# Patient Record
Sex: MALE | Race: WHITE | NOT HISPANIC OR LATINO | Employment: FULL TIME | URBAN - METROPOLITAN AREA
[De-identification: names, ages, dates, MRNs, and addresses within clinical notes are randomized per-mention and may not be internally consistent; named-entity substitution may affect disease eponyms.]

---

## 2024-03-04 ENCOUNTER — HOSPITAL ENCOUNTER (EMERGENCY)
Facility: CLINIC | Age: 64
Discharge: HOME | End: 2024-03-04
Attending: EMERGENCY MEDICINE | Admitting: EMERGENCY MEDICINE
Payer: OTHER MISCELLANEOUS

## 2024-03-04 VITALS
TEMPERATURE: 98 F | OXYGEN SATURATION: 98 % | SYSTOLIC BLOOD PRESSURE: 156 MMHG | DIASTOLIC BLOOD PRESSURE: 96 MMHG | RESPIRATION RATE: 16 BRPM | HEART RATE: 97 BPM

## 2024-03-04 DIAGNOSIS — S61.412A LACERATION OF LEFT HAND WITHOUT FOREIGN BODY, INITIAL ENCOUNTER: ICD-10-CM

## 2024-03-04 PROCEDURE — 99283 EMERGENCY DEPT VISIT LOW MDM: CPT

## 2024-03-04 PROCEDURE — 12001 RPR S/N/AX/GEN/TRNK 2.5CM/<: CPT

## 2024-03-04 ASSESSMENT — ACTIVITIES OF DAILY LIVING (ADL)
ADLS_ACUITY_SCORE: 35
ADLS_ACUITY_SCORE: 35

## 2024-03-04 ASSESSMENT — COLUMBIA-SUICIDE SEVERITY RATING SCALE - C-SSRS
6. HAVE YOU EVER DONE ANYTHING, STARTED TO DO ANYTHING, OR PREPARED TO DO ANYTHING TO END YOUR LIFE?: NO
1. IN THE PAST MONTH, HAVE YOU WISHED YOU WERE DEAD OR WISHED YOU COULD GO TO SLEEP AND NOT WAKE UP?: NO
2. HAVE YOU ACTUALLY HAD ANY THOUGHTS OF KILLING YOURSELF IN THE PAST MONTH?: NO

## 2024-03-04 NOTE — ED PROVIDER NOTES
History     Chief Complaint:  Laceration       HPI   Naina Presley is a 63 year old male with no significant past medical history significant who presents to the ED via/accompanied by self with a chief complaint of chief complaint of laceration to the palmar base of left thumb occurring at work while cleaning superglue with a knife just prior to arrival.  Patient reports moderate pain is worse with movement.  He reports that he is right-hand dominant.      Independent Historian: history provided by the patient    Review of External Notes: See MDM        Allergies:  No Known Allergies     Medications:    oxyCODONE-acetaminophen (PERCOCET) 5-325 MG per tablet        Past Medical History:    No past medical history on file.    Past Surgical History:    Past Surgical History:   Procedure Laterality Date    AMPUTATION      CARDIAC SURGERY      ORTHOPEDIC SURGERY          Family History:    family history is not on file.    Social History:   reports that he has never smoked. He does not have any smokeless tobacco history on file. He reports that he does not drink alcohol.  PCP: Lloyd Ford     Physical Exam   Patient Vitals for the past 24 hrs:   BP Temp Temp src Pulse Resp SpO2   03/04/24 0532 (!) 156/96 98  F (36.7  C) Temporal 97 16 98 %        Physical Exam  Constitutional: Well developed, nontox appearance  Head: Atraumatic.   Neck:  no stridor  Eyes: no scleral icterus  Cardiovascular: RRR, 2+ bilat radial pulses  Pulmonary/Chest: nml resp effort  Ext: Warm, well perfused, no edema  LUE: 2 cm laceration to the palmar surface of the left thumb base, hemostatic, full range of motion, no laxity or weakness, brisk cap refill, sensation grossly intact  Neurological: A&O, symmetric facies, moves ext x4  Skin: Skin is warm and dry.   Psychiatric: Behavior is normal. Thought content normal.   Nursing note and vitals reviewed.    Emergency Department Course   ECG:  No results found for this or any previous  visit.    Imaging:  No orders to display        Report per radiology unless otherwise specified in report or noted in MDM    Laboratory:  Labs Ordered and Resulted from Time of ED Arrival to Time of ED Departure - No data to display     Procedures       Narrative: Procedure: Laceration Repair        LACERATION:  A simple and superficial clean 2 cm laceration.      LOCATION:  L hand, palmar base of L thumb      FUNCTION:  Distally sensation, circulation, motor, and tendon function are intact.      ANESTHESIA:  Local using bupivacaine 0.5% total of 3 mLs      PREPARATION:  Irrigation and Scrubbing with Normal Saline and Shur Clens      DEBRIDEMENT:  no debridement and wound explored, no foreign body found      CLOSURE:  Wound was closed with One Layer.  Skin closed with 5 x 5.0 Ethilon using interrupted sutures.    Emergency Department Course & Assessments:             Interventions:  Medications - No data to display     Independent Interpretation (X-rays, CTs, rhythm strip):  See MDM    Consultations/Discussion of Management or Tests:  none     Social Determinants of Health affecting care:  See MDM    Disposition:  The patient was discharged to home.     Impression & Plan    CMS Diagnoses: none    Medical Decision Makin year old male presenting w/ L hand/thumb laceration    Social determinants affecting patient's health include: no significal social determinants negatively affecting the patient's health     I reviewed medical records from [facility] for an overview of the patient's immunization status    The patient presented with a laceration.  The wound was carefully evaluated and explored.  The laceration was closed as noted in the procedure note.  There is no evidence of muscular, tendon, bone, or nerve damage with this laceration.  Possible complications (infection, scarring) were reviewed with the patient and/or written instructions were provided and reviewed by nursing staff.  At this time I feel the pt is  safe for discharge.  Recommendations given regarding follow up with PCP/clinic for suture removal and return to the emergency department as needed for new or worsening symptoms.  Pt counseled on disposition and diagnosis.  They are understanding and agreeable to plan. Patient discharged in stable condition.        Diagnosis:    ICD-10-CM    1. Laceration of left hand without foreign body, initial encounter  S61.412A            Discharge Medications:  Discharge Medication List as of 3/4/2024  7:31 AM           3/4/2024   Salvador Bryson MD Vaughn, Christopher E, MD  03/04/24 1547

## 2024-03-04 NOTE — DISCHARGE INSTRUCTIONS
1. Do not get laceration wet for 24 to 48 hours.  After, you may wash gently with warm soapy water.  Do not submerge laceration (ex. Bath tub, pool) until the sutures are removed.  2. You may use ice as needed for swelling.  3. -Take acetaminophen 500 to 1000 mg by mouth every 4 to 6 hours as needed for pain or fever.  Do not take more than 4000 mg in 24 hours.  Do not take within 6 hours of another acetaminophen containing medication such as norco (vicodin) or percocet.  - Take ibuprofen 600 to 800 mg by mouth every 6 to 8 hours as needed for pain or fever  4. Please follow-up in clinic in 10 days for suture removal. There were 5 sutures placed.  5. Please return to the ED as needed for new or worsening symptoms such as redness to surrounding tissue, draining pus, fever, multiple episodes of vomiting, any other concerning symptoms.    Please apply high SPF sunscreen (50 or higher) to laceration for 3-6 months after healing to help prevent scar formation.    Discharge Instructions  Laceration (Cut)    You were seen today for a laceration (cut).  Your provider examined your laceration for any problems such a buried foreign body (like glass, a splinter, or gravel), or injury to blood vessels, tendons, and nerves.  Your provider may have also rinsed and/or scrubbed your laceration to help prevent an infection. It may not be possible to find all problems with your laceration on the first visit; occasionally foreign bodies or a tendon injury can go undetected.    Your laceration may have been closed in one of several ways:  No closure: many wounds will heal just fine without closure.  Stitches: regular stitches that require removal.  Staples: skin staples are often used in the scalp/head.  Wound adhesive (glue): skin glue can be used for certain lacerations and doesn t require removal.  Wound strips (aka Butterfly bandages or steri-strips): these are bandages that help to close a wound.  Absorbable stitches:  dissolving   stitches that go away on their own and usually don t require removal.    A small percentage of wounds will develop an infection regardless of how well the wound is cared for. Antibiotics are generally not indicated to prevent an infection so are only given for a small number of high-risk wounds. Some lacerations are too high risk to close, and are left open to heal because closure can increase the likelihood that an infection will develop.    Remember that all lacerations, no matter how expertly repaired, will cause scarring. We consider many factors, techniques, and materials, in our efforts to provide the best possible cosmetic outcome.    Generally, every Emergency Department visit should have a follow-up clinic visit with either a primary or a specialty clinic/provider. Please follow-up as instructed by your emergency provider today.     Return to the Emergency Department right away if:  You have more redness, swelling, pain, drainage (pus), a bad smell, or red streaking from your laceration as these symptoms could indicate an infection.  You have a fever of 100.4 F or more.  You have bleeding that you cannot stop at home. If your cut starts to bleed, hold pressure on the bleeding area with a clean cloth or put pressure over the bandage.  If the bleeding does not stop after using constant pressure for 30 minutes, you should return to the Emergency Department for further treatment.  An area past the laceration is cool, pale, or blue compared with the other side, or has a slower return of color when squeezed.  Your dressing seems too tight or starts to get uncomfortable or painful. For children, signs of a problem might be irritability or restlessness.  You have loss of normal function or use of an area, such as being unable to straighten or bend a finger normally.  You have a numb area past the laceration.    Return to the Emergency Department or see your regular provider if:  The laceration starts to come open.    You have something coming out of the cut or a feeling that there is something in the laceration.  Your wound will not heal, or keeps breaking open. There can always be glass, wood, dirt or other things in any wound.  They will not always show up, even on x-rays.  If a wound does not heal, this may be why, and it is important to follow-up with your regular provider.    Home Care:  Take your dressing off in 12-24 hours, or as instructed by your provider, to check your laceration. Remove the dressing sooner if it seems too tight or painful, or if it is getting numb, tingly, or pale past the dressing.  Gently wash your laceration 1-2 times daily with clean water and mild soap. It is okay to shower or run clean water over the laceration, but do not let the laceration soak in water (no swimming).  If your laceration was closed with wound adhesive or strips: pat it dry and leave it open to the air. For all other repairs: after you wash your laceration, or at least 2 times a day, apply antibiotic ointment (such as Neosporin  or Bacitracin ) to the laceration, then cover it with a Band-Aid  or gauze.  Keep the laceration clean. Wear gloves or other protective clothing if you are around dirt.    Follow-up for removal:  If your wound was closed with staples or regular stitches, they need to be removed according to the instructions and timeline specified by your provider today.  If your wound was closed with absorbable ( dissolving ) sutures, they should fall out, dissolve, or not be visible in about one week. If they are still visible, then they should be removed according to the instructions and timeline specified by your provider today.    Scars:  To help minimize scarring:  Wear sunscreen over the healed laceration when out in the sun.  Massage the area regularly once healed.  You may apply Vitamin E to the healed wound.  Wait. Scars improve in appearance over months and years.    If you were given a prescription for  medicine here today, be sure to read all of the information (including the package insert) that comes with your prescription.  This will include important information about the medicine, its side effects, and any warnings that you need to know about.  The pharmacist who fills the prescription can provide more information and answer questions you may have about the medicine.  If you have questions or concerns that the pharmacist cannot address, please call or return to the Emergency Department.       Remember that you can always come back to the Emergency Department if you are not able to see your regular provider in the amount of time listed above, if you get any new symptoms, or if there is anything that worries you.

## 2024-03-04 NOTE — ED TRIAGE NOTES
Pt arrives via triage presenting with 1 inch laceration to L base of thumb. Pt states he was at work and accidentally cut it with a clean knife.      Triage Assessment (Adult)       Row Name 03/04/24 0530          Triage Assessment    Airway WDL WDL        Respiratory WDL    Respiratory WDL WDL        Skin Circulation/Temperature WDL    Skin Circulation/Temperature WDL X  laceration to L thumb        Peripheral/Neurovascular WDL    Peripheral Neurovascular WDL WDL        Cognitive/Neuro/Behavioral WDL    Cognitive/Neuro/Behavioral WDL WDL